# Patient Record
Sex: MALE | Race: WHITE | URBAN - METROPOLITAN AREA
[De-identification: names, ages, dates, MRNs, and addresses within clinical notes are randomized per-mention and may not be internally consistent; named-entity substitution may affect disease eponyms.]

---

## 2022-06-03 ENCOUNTER — TELEPHONE (OUTPATIENT)
Dept: CARDIOLOGY | Facility: CLINIC | Age: 67
End: 2022-06-03
Payer: COMMERCIAL

## 2022-06-03 NOTE — TELEPHONE ENCOUNTER
Spoke to patient who has not seen Dr Mcmillan since 12/2016.  Deferred patient to he PCP for follow up.  Reviewed that Dr Mcmillan manages the electrical part of the heart.  Patient provided verbal understanding regarding above.  MANUELA Suarez

## 2022-06-03 NOTE — TELEPHONE ENCOUNTER
[facility] Call [facility]    Phone Message    May a detailed message be left on voicemail: yes     Reason for Call: Other: pt says his LDL cholestrol went up a bit and his PCP stated he should be on a statin and wants to discuss that w/Dr. Mcmillan as they had talked about the statins in the past.     Action Taken: Message routed to:  [facility] ([facility] cardio    Travel Screening: Not Applicable